# Patient Record
Sex: FEMALE | Race: OTHER | NOT HISPANIC OR LATINO
[De-identification: names, ages, dates, MRNs, and addresses within clinical notes are randomized per-mention and may not be internally consistent; named-entity substitution may affect disease eponyms.]

---

## 2019-07-26 PROBLEM — Z00.00 ENCOUNTER FOR PREVENTIVE HEALTH EXAMINATION: Status: ACTIVE | Noted: 2019-07-26

## 2019-08-02 ENCOUNTER — APPOINTMENT (OUTPATIENT)
Dept: ENDOCRINOLOGY | Facility: CLINIC | Age: 64
End: 2019-08-02

## 2020-11-25 ENCOUNTER — RESULT REVIEW (OUTPATIENT)
Age: 65
End: 2020-11-25

## 2021-12-07 ENCOUNTER — RESULT REVIEW (OUTPATIENT)
Age: 66
End: 2021-12-07

## 2022-06-12 PROBLEM — N64.4 BREAST PAIN, RIGHT: Status: ACTIVE | Noted: 2022-06-12

## 2022-06-12 NOTE — PHYSICAL EXAM
[Supple] : supple [No Supraclavicular Adenopathy] : no supraclavicular adenopathy [Examined in the supine and seated position] : examined in the supine and seated position [Asymmetrical] : asymmetrical [No dominant masses] : no dominant masses in right breast  [No dominant masses] : no dominant masses left breast [No Nipple Retraction] : no left nipple retraction [No Nipple Discharge] : no left nipple discharge [No Axillary Lymphadenopathy] : no left axillary lymphadenopathy

## 2022-06-15 ENCOUNTER — APPOINTMENT (OUTPATIENT)
Dept: BREAST CENTER | Facility: CLINIC | Age: 67
End: 2022-06-15
Payer: COMMERCIAL

## 2022-06-15 VITALS
BODY MASS INDEX: 26.9 KG/M2 | HEART RATE: 107 BPM | OXYGEN SATURATION: 97 % | HEIGHT: 60 IN | DIASTOLIC BLOOD PRESSURE: 96 MMHG | WEIGHT: 137 LBS | SYSTOLIC BLOOD PRESSURE: 157 MMHG | TEMPERATURE: 98.9 F

## 2022-06-15 DIAGNOSIS — R92.1 MAMMOGRAPHIC CALCIFICATION FOUND ON DIAGNOSTIC IMAGING OF BREAST: ICD-10-CM

## 2022-06-15 DIAGNOSIS — N64.4 MASTODYNIA: ICD-10-CM

## 2022-06-15 PROCEDURE — 99204 OFFICE O/P NEW MOD 45 MIN: CPT

## 2022-06-15 RX ORDER — DOXYCYCLINE HYCLATE 100 MG/1
100 TABLET ORAL
Qty: 14 | Refills: 0 | Status: ACTIVE | COMMUNITY
Start: 2022-03-30

## 2022-06-15 RX ORDER — COVID-19 ANTIGEN TEST
KIT MISCELLANEOUS
Qty: 4 | Refills: 0 | Status: ACTIVE | COMMUNITY
Start: 2022-05-09

## 2022-06-15 RX ORDER — AMLODIPINE BESYLATE 2.5 MG/1
2.5 TABLET ORAL
Qty: 30 | Refills: 0 | Status: ACTIVE | COMMUNITY
Start: 2022-01-28

## 2022-06-15 RX ORDER — METOPROLOL SUCCINATE 25 MG/1
25 TABLET, EXTENDED RELEASE ORAL
Qty: 30 | Refills: 0 | Status: ACTIVE | COMMUNITY
Start: 2022-05-30

## 2022-06-15 RX ORDER — AMOXICILLIN 500 MG/1
500 CAPSULE ORAL
Qty: 30 | Refills: 0 | Status: ACTIVE | COMMUNITY
Start: 2022-01-17

## 2022-06-15 RX ORDER — HALOBETASOL PROPIONATE 0.5 MG/G
0.05 OINTMENT TOPICAL
Qty: 50 | Refills: 0 | Status: ACTIVE | COMMUNITY
Start: 2022-03-01

## 2022-06-17 NOTE — CONSULT LETTER
[Dear  ___] : Dear ~TALI, [Consult Letter:] : I had the pleasure of evaluating your patient, [unfilled]. [Please see my note below.] : Please see my note below. [Consult Closing:] : Thank you very much for allowing me to participate in the care of this patient.  If you have any questions, please do not hesitate to contact me. [Sincerely,] : Sincerely, [FreeTextEntry2] : Dr. Darby Amaro [FreeTextEntry3] : Eugenio\par \par Eugenio Robbins MD\par Chief of Breast Surgery\par Director of Breast Cancer Program\par [facility]\par \par \par

## 2022-06-17 NOTE — ASSESSMENT
[FreeTextEntry1] : 66 year old female presents for initial evaluation regarding right breast pain. Discussed with the patient that mastalgia is not a common sign of breast cancer. I reviewed the use of OTC evening primrose oil (high dose EPO handout was provided). Physical exam WNL. Most recent imaging reviewed, B/L DX MMG/targeted R US 6/3/22 revealed R outer quadrant probably-benign  punctate calcifications and R UOQ 10:00 probably-benign circumscribed round equal-density mass corresponding to a cluster of cysts 5cmfn, however no US correlate for the symptom of focal pain at R 9:30 6cmfn. Plan for R DX MMG, R targeted US (at [place]) and re-examination in December 2022. Patient verbalizes understanding and is in agreement with the plan.\par \par \par

## 2022-06-17 NOTE — HISTORY OF PRESENT ILLNESS
[FreeTextEntry1] : 66 year old female referred by Dr. Amaro (OBGYN) presents for initial evaluation regarding right breast and right axillary pain that comes and goes, first noticed in March 2022. Patient reports history of multiple bilateral cyst aspirations with Dr. Beena Dean in the past. Denies prior breast surgeries. Patient has a history of prior benign right breast biopsy >10 years ago.  Patient denies palpable masses, nipple discharge, skin changes. Patient denies family history of breast or ovarian cancer.\par \par B/L DX MMG/targeted R US 6/3/22 revealed R outer quadrant probably-benign punctate calcifications and R UOQ 10:00 probably-benign circumscribed round equal-density mass corresponding to a cluster of cysts 5cmfn, however no US correlate for the symptom of focal pain at R 9:30 6cmfn. \par \par Of note, patient with indolent B cell lymphoma, cancerous R lung nodule surgically removed in January 2021. \par \par Minoo Lifetime Risk 4.5%\par \par \par \par

## 2022-06-17 NOTE — DATA REVIEWED
[FreeTextEntry1] : 6/3/22 ([place]) B/L DX MMG & targeted R US: scattered areas of fibroglandular density. \par -In the RIGHT outer quadrant there are probably-benign punctate calcifications present. FOLLOW-UP: 6 month f/u R DX MMG\par -In the RIGHT UOQ at 10:00 middle depth there is a circumscribed, round, equal-density mass present, this may correspond to a cluster of macrocysts present 5cmfn. FOLLOW-UP: 6 month f/u R DX MMG and R targeted US \par -RIGHT typically-benign vascular calcification noted\par -RIGHT UOQ 9:30 6cmfn there is no US correlate for the symptom of focal pain\par BIRADS-3: Probably benign

## 2022-12-06 ENCOUNTER — RESULT REVIEW (OUTPATIENT)
Age: 67
End: 2022-12-06

## 2022-12-09 PROBLEM — Z12.39 BREAST CANCER SCREENING: Status: ACTIVE | Noted: 2022-12-09

## 2022-12-14 ENCOUNTER — APPOINTMENT (OUTPATIENT)
Dept: BREAST CENTER | Facility: CLINIC | Age: 67
End: 2022-12-14

## 2022-12-14 VITALS
WEIGHT: 136 LBS | TEMPERATURE: 97.5 F | HEIGHT: 65 IN | SYSTOLIC BLOOD PRESSURE: 153 MMHG | HEART RATE: 90 BPM | DIASTOLIC BLOOD PRESSURE: 88 MMHG | BODY MASS INDEX: 22.66 KG/M2

## 2022-12-14 DIAGNOSIS — Z12.39 ENCOUNTER FOR OTHER SCREENING FOR MALIGNANT NEOPLASM OF BREAST: ICD-10-CM

## 2022-12-14 PROCEDURE — 99213 OFFICE O/P EST LOW 20 MIN: CPT

## 2022-12-23 NOTE — ASSESSMENT
[FreeTextEntry1] : 66 year old female presents for follow up of right breast/axillary pain since March 2022. Patient reports history of multiple bilateral cyst aspirations. Patient is without complaint, physical exam WNL. B/L DX MMG/targeted R US 6/3/22 revealed R outer quadrant probably-benign punctate calcifications and R UOQ 10:00 probably-benign circumscribed round equal-density mass corresponding to a cluster of cysts 5cmfn, however no US correlate for the symptom of focal pain at R 9:30 6cmfn. Follow up R dxMMG/US performed 12/5/22 shows probably benign complicated cyst 10n5, BIRADS 3 recommending 6 month follow up dxMMG/US that she will have performed at the same time as her annual screening. After imaging she will return for reexamination. Patient verbalized understanding and agreement of plan.\par

## 2022-12-23 NOTE — DATA REVIEWED
[FreeTextEntry1] : 6/3/22 ([place]) B/L DX MMG & targeted R US: scattered areas of fibroglandular density. \par -In the RIGHT outer quadrant there are probably-benign punctate calcifications present. FOLLOW-UP: 6 month f/u R DX MMG\par -In the RIGHT UOQ at 10:00 middle depth there is a circumscribed, round, equal-density mass present, this may correspond to a cluster of macrocysts present 5cmfn. FOLLOW-UP: 6 month f/u R DX MMG and R targeted US \par -RIGHT typically-benign vascular calcification noted\par -RIGHT UOQ 9:30 6cmfn there is no US correlate for the symptom of focal pain\par BIRADS-3: Probably benign \par \par 12/5/22 R dxMMG/US ([facility]): Probably benign complicated cyst 10n5, BIRADS 3 recommending 6 month follow up dxMMG/US that she will have performed at the same time as her annual screening.

## 2022-12-23 NOTE — HISTORY OF PRESENT ILLNESS
[FreeTextEntry1] : 66 year old female presents for follow up of right breast/axillary pain since March 2022. Patient reports history of multiple bilateral cyst aspirations with Dr. Beena Dean in the past. Denies prior breast surgeries. Patient has a history of prior benign right breast biopsy >10 years ago.  Patient denies palpable masses, nipple discharge, skin changes. Patient denies family history of breast or ovarian cancer.\par \par B/L DX MMG/targeted R US 6/3/22 revealed R outer quadrant probably-benign punctate calcifications and R UOQ 10:00 probably-benign circumscribed round equal-density mass corresponding to a cluster of cysts 5cmfn, however no US correlate for the symptom of focal pain at R 9:30 6cmfn. Follow up R dxMMG/US performed 12/5/22 shows probably benign complicated cyst 10n5, BIRADS 3 recommending 6 month follow up dxMMG/US that she will have performed at the same time as her annual screening.\par \par Of note, patient with indolent B cell lymphoma, cancerous R lung nodule surgically removed in January 2021. \par \par Minoo Lifetime Risk 4.5%\par \par \par \par

## 2023-06-21 ENCOUNTER — APPOINTMENT (OUTPATIENT)
Dept: BREAST CENTER | Facility: CLINIC | Age: 68
End: 2023-06-21
Payer: COMMERCIAL

## 2023-06-21 VITALS
WEIGHT: 143 LBS | DIASTOLIC BLOOD PRESSURE: 95 MMHG | HEIGHT: 65 IN | BODY MASS INDEX: 23.82 KG/M2 | SYSTOLIC BLOOD PRESSURE: 159 MMHG | HEART RATE: 79 BPM

## 2023-06-21 DIAGNOSIS — N60.19 DIFFUSE CYSTIC MASTOPATHY OF UNSPECIFIED BREAST: ICD-10-CM

## 2023-06-21 DIAGNOSIS — R92.8 OTHER ABNORMAL AND INCONCLUSIVE FINDINGS ON DIAGNOSTIC IMAGING OF BREAST: ICD-10-CM

## 2023-06-21 PROCEDURE — 99213 OFFICE O/P EST LOW 20 MIN: CPT

## 2023-06-22 PROBLEM — R92.8 ABNORMAL FINDING ON BREAST IMAGING: Status: ACTIVE | Noted: 2022-06-12

## 2023-07-03 NOTE — DATA REVIEWED
[FreeTextEntry1] : 6/3/22 ([place]) B/L DX MMG & targeted R US: scattered areas of fibroglandular density. \par -In the RIGHT outer quadrant there are probably-benign punctate calcifications present. FOLLOW-UP: 6 month f/u R DX MMG\par -In the RIGHT UOQ at 10:00 middle depth there is a circumscribed, round, equal-density mass present, this may correspond to a cluster of macrocysts present 5cmfn. FOLLOW-UP: 6 month f/u R DX MMG and R targeted US \par -RIGHT typically-benign vascular calcification noted\par -RIGHT UOQ 9:30 6cmfn there is no US correlate for the symptom of focal pain\par BIRADS-3: Probably benign \par \par 12/5/22 R dxMMG/US ([facility]): Probably benign complicated cyst 10n5, BIRADS 3 recommending 6 month follow up dxMMG/US that she will have performed at the same time as her annual screening.\par \par 6/5/2023 ([facility]) B/L DX MMG/US: scattered areas of fbg density. \par -RIGHT upper outer at 10:00 5cmfn complicated cyst that is unchanged in size and appearance; probably benign finding present. FOLLOW-UP: diagnostic mammogram in 1 year & diagnostic US in 1 year\par -RIGHT LOQ middle depth probably benign punctate calcifications present that are unchanged in number. FOLLOW-UP: diagnostic mammography in one year (patient will be due for bilateral screening mammogram at time of follow-up). No evidence of malignancy. BIRADS-3: Probably benign findings

## 2023-07-03 NOTE — PAST MEDICAL HISTORY
[Menarche Age ____] : age at menarche was [unfilled] [Menopause Age____] : age at menopause was [unfilled] [Total Preg ___] : G[unfilled] [Live Births ___] : P[unfilled]  [Age At Live Birth ___] : Age at live birth: [unfilled] [FreeTextEntry6] : No [FreeTextEntry7] : No [FreeTextEntry8] : Yes

## 2023-07-03 NOTE — ASSESSMENT
[FreeTextEntry1] : 67 year old female presents for follow up of right breast/axillary pain since March 2022. Patient reports history of multiple bilateral cyst aspirations. Patient is without complaint, physical exam WNL. B/L DX MMG/targeted R US 6/3/22 revealed R outer quadrant probably-benign punctate calcifications and R UOQ 10:00 probably-benign circumscribed round equal-density mass corresponding to a cluster of cysts 5cmfn, however no US correlate for the symptom of focal pain at R 9:30 6cmfn. Follow up R dxMMG/US performed 12/5/22 shows probably benign complicated cyst 10n5, BIRADS 3 recommending 6 month follow up dxMMG/US, performed 6/5/23 BIRADS-3 which revealed a RIGHT upper outer at 10:00 5cmfn complicated cyst that is unchanged in size and appearance and RIGHT LOQ middle depth probably benign punctate calcifications present that are unchanged in number; with recommendation for diagnostic mammogram and US in 1 year when patient will be due for screening mammography. \par \par Plan for B/L DX MMG/US and re-examination in 1 year. Patient verbalizes understanding and is in agreement with the plan.\par

## 2023-07-03 NOTE — HISTORY OF PRESENT ILLNESS
[FreeTextEntry1] : 67 year old female presents for follow up of right breast/axillary pain since March 2022. Patient reports history of multiple bilateral cyst aspirations with Dr. Beena Dean in the past. Denies prior breast surgeries. Patient has a history of prior benign right breast biopsy >10 years ago.  Patient denies palpable masses, nipple discharge, skin changes. Patient denies family history of breast or ovarian cancer.\par \par B/L DX MMG/targeted R US 6/3/22 revealed R outer quadrant probably-benign punctate calcifications and R UOQ 10:00 probably-benign circumscribed round equal-density mass corresponding to a cluster of cysts 5cmfn, however no US correlate for the symptom of focal pain at R 9:30 6cmfn. Follow up R dxMMG/US performed 12/5/22 shows probably benign complicated cyst 10n5, BIRADS 3 recommending 6 month follow up dxMMG/US that she will have performed at the same time as her annual screening.\par \par B/L sMMG/US with R diagnostic views June 2023 at [place] BIRADS-3 which revealed a RIGHT upper outer at 10:00 5cmfn complicated cyst that is unchanged in size and appearance and RIGHT LOQ middle depth probably benign punctate calcifications present that are unchanged in number; with recommendation for diagnostic mammogram and US in 1 year when patient will be due for screening mammography. \par \par Of note, patient with indolent B cell lymphoma, cancerous R lung nodule surgically removed in January 2021. \par \par Minoo Lifetime Risk 4.5% \par \par \par \par

## 2024-06-06 ENCOUNTER — OUTPATIENT (OUTPATIENT)
Dept: OUTPATIENT SERVICES | Facility: HOSPITAL | Age: 69
LOS: 1 days | End: 2024-06-06

## 2024-06-06 ENCOUNTER — APPOINTMENT (OUTPATIENT)
Dept: MAMMOGRAPHY | Facility: HOSPITAL | Age: 69
End: 2024-06-06
Payer: COMMERCIAL

## 2024-06-06 ENCOUNTER — RESULT REVIEW (OUTPATIENT)
Age: 69
End: 2024-06-06

## 2024-06-06 ENCOUNTER — APPOINTMENT (OUTPATIENT)
Dept: ULTRASOUND IMAGING | Facility: HOSPITAL | Age: 69
End: 2024-06-06
Payer: COMMERCIAL

## 2024-06-06 PROCEDURE — 76641 ULTRASOUND BREAST COMPLETE: CPT | Mod: 26,50

## 2024-06-06 PROCEDURE — G0279: CPT | Mod: 26

## 2024-06-06 PROCEDURE — 77066 DX MAMMO INCL CAD BI: CPT | Mod: 26

## 2024-06-06 PROCEDURE — 76641 ULTRASOUND BREAST COMPLETE: CPT

## 2024-06-06 PROCEDURE — G0279: CPT

## 2024-06-06 PROCEDURE — 77066 DX MAMMO INCL CAD BI: CPT

## 2024-07-29 PROBLEM — N60.19 MULTIPLE CYSTS OF BREAST: Status: RESOLVED | Noted: 2023-06-21 | Resolved: 2024-07-29

## 2024-07-30 ENCOUNTER — APPOINTMENT (OUTPATIENT)
Dept: BREAST CENTER | Facility: CLINIC | Age: 69
End: 2024-07-30
Payer: COMMERCIAL

## 2024-07-30 VITALS
BODY MASS INDEX: 24.32 KG/M2 | HEIGHT: 65 IN | DIASTOLIC BLOOD PRESSURE: 86 MMHG | WEIGHT: 146 LBS | HEART RATE: 82 BPM | SYSTOLIC BLOOD PRESSURE: 152 MMHG

## 2024-07-30 DIAGNOSIS — Z12.39 ENCOUNTER FOR OTHER SCREENING FOR MALIGNANT NEOPLASM OF BREAST: ICD-10-CM

## 2024-07-30 DIAGNOSIS — N60.19 DIFFUSE CYSTIC MASTOPATHY OF UNSPECIFIED BREAST: ICD-10-CM

## 2024-07-30 PROCEDURE — 99213 OFFICE O/P EST LOW 20 MIN: CPT

## 2024-08-02 NOTE — HISTORY OF PRESENT ILLNESS
[FreeTextEntry1] : 68 year old female presents for follow up of right breast/axillary pain since March 2022. Patient reports history of multiple bilateral cyst aspirations with Dr. Beena Dean in the past. Denies prior breast surgeries. Patient has a history of prior benign right breast biopsy >10 years ago.  Patient denies palpable masses, nipple discharge, skin changes. Patient denies family history of breast or ovarian cancer.  B/L DX MMG/targeted R US 6/3/22 revealed R outer quadrant probably benign punctate calcifications and R UOQ 10:00 probably benign circumscribed round equal-density mass corresponding to a cluster of cysts 5cmfn, however no US correlate for the symptom of focal pain at R 9:30 6cmfn. Follow up R dxMMG/US performed 12/5/22 shows probably benign complicated cyst 10n5, BIRADS 3 recommending 6 month follow up dxMMG/US.  B/L sMMG/US with R diagnostic views June 2023 at [place] BIRADS-3 which revealed a RIGHT upper outer at 10:00 5cmfn complicated cyst that is unchanged in size and appearance and RIGHT LOQ middle depth probably benign punctate calcifications present that are unchanged in number; with recommendation for diagnostic mammogram and US in 1 year when patient will be due for screening mammography.   B/L DX MMG/US (6/6/24), BI-RADS 2 yielded the stable R 10:00 complicated cyst measuring 9mm, unchanged since 6/5/23.  Of note, patient with indolent B cell lymphoma, cancerous R lung nodule surgically removed in January 2021.   Minoo Lifetime Risk 4.5%

## 2024-08-02 NOTE — DATA REVIEWED
[FreeTextEntry1] : 6/3/22 ([place]) B/L DX MMG & targeted R US: scattered areas of fibroglandular density.  -In the RIGHT outer quadrant there are probably-benign punctate calcifications present. FOLLOW-UP: 6 month f/u R DX MMG -In the RIGHT UOQ at 10:00 middle depth there is a circumscribed, round, equal-density mass present, this may correspond to a cluster of macrocysts present 5cmfn. FOLLOW-UP: 6 month f/u R DX MMG and R targeted US  -RIGHT typically-benign vascular calcification noted -RIGHT UOQ 9:30 6cmfn there is no US correlate for the symptom of focal pain BIRADS-3: Probably benign   12/5/22 R dxMMG/US ([facility]): Probably benign complicated cyst 10n5, BIRADS 3 recommending 6 month follow up dxMMG/US that she will have performed at the same time as her annual screening.  6/5/2023 ([facility]) B/L DX MMG/US: scattered areas of fbg density.  -RIGHT upper outer at 10:00 5cmfn complicated cyst that is unchanged in size and appearance; probably benign finding present. FOLLOW-UP: diagnostic mammogram in 1 year & diagnostic US in 1 year -RIGHT LOQ middle depth probably benign punctate calcifications present that are unchanged in number. FOLLOW-UP: diagnostic mammography in one year (patient will be due for bilateral screening mammogram at time of follow-up). No evidence of malignancy. BIRADS-3: Probably benign findings   6/6/24 ([facility]) B/L DX MMG/US: heterogeneously dense. R 10:00 5cmfn complicated cyst measuring 9mm, unchanged since 6/5/23. BI-RADS 2, benign. Follow up: annual screening

## 2024-08-02 NOTE — ASSESSMENT
[FreeTextEntry1] : 68 year old female presents for follow up of right breast/axillary pain since March 2022. Patient reports history of multiple bilateral cyst aspirations.   B/L DX MMG/targeted R US 6/3/22 revealed R outer quadrant probably-benign punctate calcifications and R UOQ 10:00 probably-benign circumscribed round equal-density mass corresponding to a cluster of cysts 5cmfn, however no US correlate for the symptom of focal pain at R 9:30 6cmfn. Follow up R dxMMG/US performed 12/5/22 shows probably benign complicated cyst 10n5, BIRADS 3 recommending 6 month follow up dxMMG/US, performed 6/5/23 BIRADS-3 which revealed a RIGHT upper outer at 10:00 5cmfn complicated cyst that is unchanged in size and appearance and RIGHT LOQ middle depth probably benign punctate calcifications present that are unchanged in number. Latest annual imaging; B/L DX MMG/US (6/6/24), BI-RADS 2 yielded stable, unchanged R 10:00 mass.  Patient is without complaint, physical exam WNL. Most recent imaging reviewed, as above. Plan for B/L sMMG/US and re-examination in 1 year. Patient verbalizes understanding and is in agreement with the plan.

## 2024-08-02 NOTE — DATA REVIEWED
[FreeTextEntry1] : 6/3/22 ([place]) B/L DX MMG & targeted R US: scattered areas of fibroglandular density.  -In the RIGHT outer quadrant there are probably-benign punctate calcifications present. FOLLOW-UP: 6 month f/u R DX MMG -In the RIGHT UOQ at 10:00 middle depth there is a circumscribed, round, equal-density mass present, this may correspond to a cluster of macrocysts present 5cmfn. FOLLOW-UP: 6 month f/u R DX MMG and R targeted US  -RIGHT typically-benign vascular calcification noted -RIGHT UOQ 9:30 6cmfn there is no US correlate for the symptom of focal pain BIRADS-3: Probably benign   12/5/22 R dxMMG/US ([facility]): Probably benign complicated cyst 10n5, BIRADS 3 recommending 6 month follow up dxMMG/US that she will have performed at the same time as her annual screening.  6/5/2023 ([facility]) B/L DX MMG/US: scattered areas of fbg density.  -RIGHT upper outer at 10:00 5cmfn complicated cyst that is unchanged in size and appearance; probably benign finding present. FOLLOW-UP: diagnostic mammogram in 1 year & diagnostic US in 1 year -RIGHT LOQ middle depth probably benign punctate calcifications present that are unchanged in number. FOLLOW-UP: diagnostic mammography in one year (patient will be due for bilateral screening mammogram at time of follow-up). No evidence of malignancy. BIRADS-3: Probably benign findings   6/6/24 ([facility]) B/L DX MMG/US: heterogeneously dense. R 10:00 5cmfn complicated cyst measuring 9mm, unchanged since 6/5/23. BI-RADS 2, benign. Follow up: annual screening

## 2024-08-02 NOTE — DATA REVIEWED
[FreeTextEntry1] : 6/3/22 ([place]) B/L DX MMG & targeted R US: scattered areas of fibroglandular density.  -In the RIGHT outer quadrant there are probably-benign punctate calcifications present. FOLLOW-UP: 6 month f/u R DX MMG -In the RIGHT UOQ at 10:00 middle depth there is a circumscribed, round, equal-density mass present, this may correspond to a cluster of macrocysts present 5cmfn. FOLLOW-UP: 6 month f/u R DX MMG and R targeted US  -RIGHT typically-benign vascular calcification noted -RIGHT UOQ 9:30 6cmfn there is no US correlate for the symptom of focal pain BIRADS-3: Probably benign   12/5/22 R dxMMG/US ([facility]): Probably benign complicated cyst 10n5, BIRADS 3 recommending 6 month follow up dxMMG/US that she will have performed at the same time as her annual screening.  6/5/2023 ([facility]) B/L DX MMG/US: scattered areas of fbg density.  -RIGHT upper outer at 10:00 5cmfn complicated cyst that is unchanged in size and appearance; probably benign finding present. FOLLOW-UP: diagnostic mammogram in 1 year & diagnostic US in 1 year -RIGHT LOQ middle depth probably benign punctate calcifications present that are unchanged in number. FOLLOW-UP: diagnostic mammography in one year (patient will be due for bilateral screening mammogram at time of follow-up). No evidence of malignancy. BIRADS-3: Probably benign findings   6/6/24 ([facility]) B/L DX MMG/US: heterogeneously dense. R 10:00 5cmfn complicated cyst measuring 9mm, unchanged since 6/5/23. BI-RADS 2, benign. Follow up: annual screening

## 2024-08-02 NOTE — HISTORY OF PRESENT ILLNESS
[FreeTextEntry1] : 68 year old female presents for follow up of right breast/axillary pain since March 2022. Patient reports history of multiple bilateral cyst aspirations with Dr. Beena Dean in the past. Denies prior breast surgeries. Patient has a history of prior benign right breast biopsy >10 years ago.  Patient denies palpable masses, nipple discharge, skin changes. Patient denies family history of breast or ovarian cancer.  B/L DX MMG/targeted R US 6/3/22 revealed R outer quadrant probably benign punctate calcifications and R UOQ 10:00 probably benign circumscribed round equal-density mass corresponding to a cluster of cysts 5cmfn, however no US correlate for the symptom of focal pain at R 9:30 6cmfn. Follow up R dxMMG/US performed 12/5/22 shows probably benign complicated cyst 10n5, BIRADS 3 recommending 6 month follow up dxMMG/US.  B/L sMMG/US with R diagnostic views June 2023 at [place] BIRADS-3 which revealed a RIGHT upper outer at 10:00 5cmfn complicated cyst that is unchanged in size and appearance and RIGHT LOQ middle depth probably benign punctate calcifications present that are unchanged in number; with recommendation for diagnostic mammogram and US in 1 year when patient will be due for screening mammography.   B/L DX MMG/US (6/6/24), BI-RADS 2 yielded the stable R 10:00 complicated cyst measuring 9mm, unchanged since 6/5/23.  Of note, patient with indolent B cell lymphoma, cancerous R lung nodule surgically removed in January 2021.   Minoo Lifetime Risk 4.5%

## 2024-08-02 NOTE — HISTORY OF PRESENT ILLNESS
[FreeTextEntry1] : 68 year old female presents for follow up of right breast/axillary pain since March 2022. Patient reports history of multiple bilateral cyst aspirations with Dr. Beena Dean in the past. Denies prior breast surgeries. Patient has a history of prior benign right breast biopsy >10 years ago.  Patient denies palpable masses, nipple discharge, skin changes. Patient denies family history of breast or ovarian cancer.  B/L DX MMG/targeted R US 6/3/22 revealed R outer quadrant probably benign punctate calcifications and R UOQ 10:00 probably benign circumscribed round equal-density mass corresponding to a cluster of cysts 5cmfn, however no US correlate for the symptom of focal pain at R 9:30 6cmfn. Follow up R dxMMG/US performed 12/5/22 shows probably benign complicated cyst 10n5, BIRADS 3 recommending 6 month follow up dxMMG/US.  B/L sMMG/US with R diagnostic views June 2023 at [place] BIRADS-3 which revealed a RIGHT upper outer at 10:00 5cmfn complicated cyst that is unchanged in size and appearance and RIGHT LOQ middle depth probably benign punctate calcifications present that are unchanged in number; with recommendation for diagnostic mammogram and US in 1 year when patient will be due for screening mammography.   B/L DX MMG/US (6/6/24), BI-RADS 2 yielded the stable R 10:00 complicated cyst measuring 9mm, unchanged since 6/5/23.  Of note, patient with indolent B cell lymphoma, cancerous R lung nodule surgically removed in January 2021.   Minoo Lifetime Risk 4.5%

## 2025-06-03 ENCOUNTER — APPOINTMENT (OUTPATIENT)
Dept: ULTRASOUND IMAGING | Facility: CLINIC | Age: 70
End: 2025-06-03

## 2025-06-03 ENCOUNTER — RESULT REVIEW (OUTPATIENT)
Age: 70
End: 2025-06-03

## 2025-06-03 ENCOUNTER — APPOINTMENT (OUTPATIENT)
Dept: MAMMOGRAPHY | Facility: CLINIC | Age: 70
End: 2025-06-03
Payer: COMMERCIAL

## 2025-06-03 PROCEDURE — 77067 SCR MAMMO BI INCL CAD: CPT

## 2025-06-03 PROCEDURE — 76641 ULTRASOUND BREAST COMPLETE: CPT | Mod: 50

## 2025-06-03 PROCEDURE — 77063 BREAST TOMOSYNTHESIS BI: CPT

## 2025-06-04 ENCOUNTER — NON-APPOINTMENT (OUTPATIENT)
Age: 70
End: 2025-06-04

## 2025-06-04 ENCOUNTER — APPOINTMENT (OUTPATIENT)
Dept: BREAST CENTER | Facility: CLINIC | Age: 70
End: 2025-06-04

## 2025-06-04 VITALS — HEIGHT: 60 IN | BODY MASS INDEX: 28.66 KG/M2 | WEIGHT: 146 LBS

## 2025-06-04 DIAGNOSIS — N64.4 MASTODYNIA: ICD-10-CM

## 2025-06-04 DIAGNOSIS — R92.8 OTHER ABNORMAL AND INCONCLUSIVE FINDINGS ON DIAGNOSTIC IMAGING OF BREAST: ICD-10-CM

## 2025-06-04 DIAGNOSIS — Z12.39 ENCOUNTER FOR OTHER SCREENING FOR MALIGNANT NEOPLASM OF BREAST: ICD-10-CM

## 2025-06-04 PROCEDURE — 99213 OFFICE O/P EST LOW 20 MIN: CPT

## 2025-06-04 RX ORDER — CALCIUM CARBONATE/VITAMIN D3 600 MG-10
TABLET ORAL
Refills: 0 | Status: ACTIVE | COMMUNITY

## 2025-06-04 RX ORDER — COLD-HOT PACK
EACH MISCELLANEOUS
Refills: 0 | Status: ACTIVE | COMMUNITY

## 2025-06-04 RX ORDER — CHROMIUM 200 MCG
TABLET ORAL
Refills: 0 | Status: ACTIVE | COMMUNITY